# Patient Record
Sex: FEMALE | Race: WHITE | URBAN - METROPOLITAN AREA
[De-identification: names, ages, dates, MRNs, and addresses within clinical notes are randomized per-mention and may not be internally consistent; named-entity substitution may affect disease eponyms.]

---

## 2020-06-02 ENCOUNTER — OFFICE VISIT (OUTPATIENT)
Dept: URBAN - METROPOLITAN AREA CLINIC 97 | Facility: CLINIC | Age: 45
End: 2020-06-02
Payer: COMMERCIAL

## 2020-06-02 VITALS
BODY MASS INDEX: 22.96 KG/M2 | SYSTOLIC BLOOD PRESSURE: 124 MMHG | HEIGHT: 69 IN | DIASTOLIC BLOOD PRESSURE: 78 MMHG | WEIGHT: 155 LBS

## 2020-06-02 DIAGNOSIS — K51.80 OTHER ULCERATIVE COLITIS WITHOUT COMPLICATION: ICD-10-CM

## 2020-06-02 PROCEDURE — 96413 CHEMO IV INFUSION 1 HR: CPT | Performed by: INTERNAL MEDICINE

## 2020-06-02 RX ORDER — DICYCLOMINE HYDROCHLORIDE 10 MG/1
TAKE 1 CAPSULE BY ORAL ROUTE 3 TIMES A DAY AS NEEDED FOR 30 DAYS CAPSULE ORAL
Qty: 90 | Refills: 5 | Status: ACTIVE | COMMUNITY
Start: 2020-02-19 | End: 2020-08-17

## 2020-06-02 RX ORDER — INFLIXIMAB 100 MG/10ML
INJECTION, POWDER, LYOPHILIZED, FOR SOLUTION INTRAVENOUS
Qty: 0 | Refills: 0 | Status: ACTIVE | COMMUNITY
Start: 1900-01-01 | End: 1900-01-01

## 2020-06-02 RX ORDER — SACCHAROMYCES BOULARDII 250 MG
CAPSULE ORAL
Qty: 0 | Refills: 0 | Status: ACTIVE | COMMUNITY
Start: 1900-01-01 | End: 1900-01-01

## 2020-07-28 ENCOUNTER — OFFICE VISIT (OUTPATIENT)
Dept: URBAN - METROPOLITAN AREA CLINIC 97 | Facility: CLINIC | Age: 45
End: 2020-07-28
Payer: COMMERCIAL

## 2020-07-28 DIAGNOSIS — K51.80 OTHER ULCERATIVE COLITIS WITHOUT COMPLICATION: ICD-10-CM

## 2020-07-28 PROCEDURE — 96413 CHEMO IV INFUSION 1 HR: CPT | Performed by: INTERNAL MEDICINE

## 2020-07-28 RX ORDER — SACCHAROMYCES BOULARDII 250 MG
CAPSULE ORAL
Qty: 0 | Refills: 0 | Status: ACTIVE | COMMUNITY
Start: 1900-01-01 | End: 1900-01-01

## 2020-07-28 RX ORDER — INFLIXIMAB 100 MG/10ML
INJECTION, POWDER, LYOPHILIZED, FOR SOLUTION INTRAVENOUS
Qty: 0 | Refills: 0 | Status: ACTIVE | COMMUNITY
Start: 1900-01-01 | End: 1900-01-01

## 2020-07-28 RX ORDER — DICYCLOMINE HYDROCHLORIDE 10 MG/1
TAKE 1 CAPSULE BY ORAL ROUTE 3 TIMES A DAY AS NEEDED FOR 30 DAYS CAPSULE ORAL
Qty: 90 | Refills: 5 | Status: ACTIVE | COMMUNITY
Start: 2020-02-19 | End: 2020-08-17

## 2020-09-22 ENCOUNTER — OFFICE VISIT (OUTPATIENT)
Dept: URBAN - METROPOLITAN AREA CLINIC 97 | Facility: CLINIC | Age: 45
End: 2020-09-22

## 2020-09-24 ENCOUNTER — OFFICE VISIT (OUTPATIENT)
Dept: URBAN - METROPOLITAN AREA CLINIC 97 | Facility: CLINIC | Age: 45
End: 2020-09-24
Payer: COMMERCIAL

## 2020-09-24 DIAGNOSIS — K51.90 COLITIS, ULCERATIVE: ICD-10-CM

## 2020-09-24 PROCEDURE — 96413 CHEMO IV INFUSION 1 HR: CPT | Performed by: INTERNAL MEDICINE

## 2020-09-24 RX ORDER — INFLIXIMAB 100 MG/10ML
INJECTION, POWDER, LYOPHILIZED, FOR SOLUTION INTRAVENOUS
Qty: 0 | Refills: 0 | Status: ACTIVE | COMMUNITY
Start: 1900-01-01 | End: 1900-01-01

## 2020-09-24 RX ORDER — SACCHAROMYCES BOULARDII 250 MG
CAPSULE ORAL
Qty: 0 | Refills: 0 | Status: ACTIVE | COMMUNITY
Start: 1900-01-01 | End: 1900-01-01

## 2020-11-19 ENCOUNTER — OFFICE VISIT (OUTPATIENT)
Dept: URBAN - METROPOLITAN AREA CLINIC 97 | Facility: CLINIC | Age: 45
End: 2020-11-19
Payer: COMMERCIAL

## 2020-11-19 DIAGNOSIS — K51.80 CHRONIC PANCOLONIC ULCERATIVE COLITIS: ICD-10-CM

## 2020-11-19 PROCEDURE — 96413 CHEMO IV INFUSION 1 HR: CPT | Performed by: INTERNAL MEDICINE

## 2020-11-19 RX ORDER — INFLIXIMAB 100 MG/10ML
INJECTION, POWDER, LYOPHILIZED, FOR SOLUTION INTRAVENOUS
Qty: 0 | Refills: 0 | Status: ACTIVE | COMMUNITY
Start: 1900-01-01 | End: 1900-01-01

## 2020-11-19 RX ORDER — SACCHAROMYCES BOULARDII 250 MG
CAPSULE ORAL
Qty: 0 | Refills: 0 | Status: ACTIVE | COMMUNITY
Start: 1900-01-01 | End: 1900-01-01

## 2021-01-14 ENCOUNTER — OFFICE VISIT (OUTPATIENT)
Dept: URBAN - METROPOLITAN AREA CLINIC 97 | Facility: CLINIC | Age: 46
End: 2021-01-14
Payer: COMMERCIAL

## 2021-01-14 VITALS
DIASTOLIC BLOOD PRESSURE: 82 MMHG | RESPIRATION RATE: 16 BRPM | TEMPERATURE: 96.5 F | WEIGHT: 161 LBS | SYSTOLIC BLOOD PRESSURE: 122 MMHG | HEIGHT: 69 IN | BODY MASS INDEX: 23.85 KG/M2

## 2021-01-14 DIAGNOSIS — K51.80 CHRONIC PANCOLONIC ULCERATIVE COLITIS: ICD-10-CM

## 2021-01-14 PROCEDURE — 96413 CHEMO IV INFUSION 1 HR: CPT | Performed by: INTERNAL MEDICINE

## 2021-01-14 PROCEDURE — 96415 CHEMO IV INFUSION ADDL HR: CPT | Performed by: INTERNAL MEDICINE

## 2021-01-14 RX ORDER — INFLIXIMAB 100 MG/10ML
INJECTION, POWDER, LYOPHILIZED, FOR SOLUTION INTRAVENOUS
Qty: 0 | Refills: 0 | Status: ACTIVE | COMMUNITY
Start: 1900-01-01 | End: 1900-01-01

## 2021-01-14 RX ORDER — SACCHAROMYCES BOULARDII 250 MG
CAPSULE ORAL
Qty: 0 | Refills: 0 | Status: ACTIVE | COMMUNITY
Start: 1900-01-01 | End: 1900-01-01

## 2021-03-11 ENCOUNTER — OFFICE VISIT (OUTPATIENT)
Dept: URBAN - METROPOLITAN AREA CLINIC 97 | Facility: CLINIC | Age: 46
End: 2021-03-11
Payer: COMMERCIAL

## 2021-03-11 ENCOUNTER — TELEPHONE ENCOUNTER (OUTPATIENT)
Dept: URBAN - METROPOLITAN AREA CLINIC 18 | Facility: CLINIC | Age: 46
End: 2021-03-11

## 2021-03-11 DIAGNOSIS — K51.80 CHRONIC PANCOLONIC ULCERATIVE COLITIS: ICD-10-CM

## 2021-03-11 PROCEDURE — 96413 CHEMO IV INFUSION 1 HR: CPT | Performed by: INTERNAL MEDICINE

## 2021-03-11 RX ORDER — INFLIXIMAB 100 MG/10ML
10 MG PER KG INJECTION, POWDER, LYOPHILIZED, FOR SOLUTION INTRAVENOUS
Qty: 800 MILLIGRAM | Refills: 6 | OUTPATIENT
Start: 2021-03-11 | End: 2028-02-03

## 2021-03-11 RX ORDER — INFLIXIMAB 100 MG/10ML
INJECTION, POWDER, LYOPHILIZED, FOR SOLUTION INTRAVENOUS
Qty: 0 | Refills: 0 | Status: ACTIVE | COMMUNITY
Start: 1900-01-01 | End: 1900-01-01

## 2021-03-11 RX ORDER — SACCHAROMYCES BOULARDII 250 MG
CAPSULE ORAL
Qty: 0 | Refills: 0 | Status: ACTIVE | COMMUNITY
Start: 1900-01-01 | End: 1900-01-01

## 2021-05-06 ENCOUNTER — OFFICE VISIT (OUTPATIENT)
Dept: URBAN - METROPOLITAN AREA CLINIC 97 | Facility: CLINIC | Age: 46
End: 2021-05-06
Payer: COMMERCIAL

## 2021-05-06 VITALS
WEIGHT: 156 LBS | RESPIRATION RATE: 16 BRPM | BODY MASS INDEX: 23.11 KG/M2 | HEART RATE: 78 BPM | TEMPERATURE: 97.4 F | DIASTOLIC BLOOD PRESSURE: 80 MMHG | SYSTOLIC BLOOD PRESSURE: 128 MMHG | HEIGHT: 69 IN

## 2021-05-06 DIAGNOSIS — K51.80 CHRONIC PANCOLONIC ULCERATIVE COLITIS: ICD-10-CM

## 2021-05-06 PROCEDURE — 96413 CHEMO IV INFUSION 1 HR: CPT | Performed by: INTERNAL MEDICINE

## 2021-05-06 RX ORDER — INFLIXIMAB 100 MG/10ML
INJECTION, POWDER, LYOPHILIZED, FOR SOLUTION INTRAVENOUS
Qty: 0 | Refills: 0 | Status: ACTIVE | COMMUNITY
Start: 1900-01-01 | End: 1900-01-01

## 2021-05-06 RX ORDER — INFLIXIMAB 100 MG/10ML
10 MG PER KG INJECTION, POWDER, LYOPHILIZED, FOR SOLUTION INTRAVENOUS
Qty: 800 MILLIGRAM | Refills: 6 | Status: ACTIVE | COMMUNITY
Start: 2021-03-11 | End: 2028-02-03

## 2021-05-06 RX ORDER — SACCHAROMYCES BOULARDII 250 MG
CAPSULE ORAL
Qty: 0 | Refills: 0 | Status: ACTIVE | COMMUNITY
Start: 1900-01-01 | End: 1900-01-01

## 2021-06-04 ENCOUNTER — WEB ENCOUNTER (OUTPATIENT)
Dept: URBAN - METROPOLITAN AREA CLINIC 92 | Facility: CLINIC | Age: 46
End: 2021-06-04

## 2021-06-16 ENCOUNTER — OFFICE VISIT (OUTPATIENT)
Dept: URBAN - METROPOLITAN AREA TELEHEALTH 2 | Facility: TELEHEALTH | Age: 46
End: 2021-06-16
Payer: COMMERCIAL

## 2021-06-16 DIAGNOSIS — K92.1 BLOOD IN STOOL: ICD-10-CM

## 2021-06-16 DIAGNOSIS — K51.00 ULCERATIVE PANCOLITIS WITHOUT COMPLICATION: ICD-10-CM

## 2021-06-16 PROCEDURE — 99214 OFFICE O/P EST MOD 30 MIN: CPT | Performed by: INTERNAL MEDICINE

## 2021-06-16 RX ORDER — SACCHAROMYCES BOULARDII 250 MG
CAPSULE ORAL
Qty: 0 | Refills: 0 | COMMUNITY
Start: 1900-01-01

## 2021-06-16 RX ORDER — MESALAMINE 1000 MG/1
1 SUPPOSITORY AT BEDTIME SUPPOSITORY RECTAL ONCE A DAY
Qty: 30 | Refills: 3 | OUTPATIENT
Start: 2021-06-16 | End: 2021-10-14

## 2021-06-16 RX ORDER — PREDNISONE 10 MG/1
1 TABLET TABLET ORAL ONCE A DAY
Qty: 30 TABLET | Refills: 0 | OUTPATIENT
Start: 2021-06-16 | End: 2021-07-16

## 2021-06-16 RX ORDER — INFLIXIMAB 100 MG/10ML
INJECTION, POWDER, LYOPHILIZED, FOR SOLUTION INTRAVENOUS
Qty: 0 | Refills: 0 | COMMUNITY
Start: 1900-01-01

## 2021-06-16 RX ORDER — INFLIXIMAB 100 MG/10ML
10 MG PER KG INJECTION, POWDER, LYOPHILIZED, FOR SOLUTION INTRAVENOUS
Qty: 800 MILLIGRAM | Refills: 6 | COMMUNITY
Start: 2021-03-11 | End: 2028-02-03

## 2021-06-16 NOTE — HPI-TODAY'S VISIT:
This is a 45-year-old  female who presents today for sick visit.  She notes that for the last several weeks she has had a worsening in her colitis symptoms.  She is having some diarrhea and passage of mucus and blood in stool.  She did have some leftover prednisone from the past.  She began taking 20 mg a day with improvement in most of her symptoms.  There is no fever.  Her last laboratory work was over a year ago.  Her last colonoscopy was 2 years ago.  Her last Remicade dose was approximately 6 weeks.

## 2021-07-01 ENCOUNTER — OFFICE VISIT (OUTPATIENT)
Dept: URBAN - METROPOLITAN AREA CLINIC 97 | Facility: CLINIC | Age: 46
End: 2021-07-01
Payer: COMMERCIAL

## 2021-07-01 VITALS
BODY MASS INDEX: 23.11 KG/M2 | HEART RATE: 83 BPM | TEMPERATURE: 96.9 F | RESPIRATION RATE: 18 BRPM | DIASTOLIC BLOOD PRESSURE: 85 MMHG | WEIGHT: 156 LBS | SYSTOLIC BLOOD PRESSURE: 120 MMHG | HEIGHT: 69 IN

## 2021-07-01 DIAGNOSIS — K51.80 CHRONIC PANCOLONIC ULCERATIVE COLITIS: ICD-10-CM

## 2021-07-01 PROCEDURE — 96413 CHEMO IV INFUSION 1 HR: CPT | Performed by: INTERNAL MEDICINE

## 2021-07-01 RX ORDER — INFLIXIMAB 100 MG/10ML
10 MG PER KG INJECTION, POWDER, LYOPHILIZED, FOR SOLUTION INTRAVENOUS
Qty: 800 MILLIGRAM | Refills: 6 | COMMUNITY
Start: 2021-03-11 | End: 2028-02-03

## 2021-07-01 RX ORDER — PREDNISONE 10 MG/1
1 TABLET TABLET ORAL ONCE A DAY
Qty: 30 TABLET | Refills: 0 | Status: ACTIVE | COMMUNITY
Start: 2021-06-16 | End: 2021-07-16

## 2021-07-01 RX ORDER — INFLIXIMAB 100 MG/10ML
INJECTION, POWDER, LYOPHILIZED, FOR SOLUTION INTRAVENOUS
Qty: 0 | Refills: 0 | COMMUNITY
Start: 1900-01-01

## 2021-07-01 RX ORDER — SACCHAROMYCES BOULARDII 250 MG
CAPSULE ORAL
Qty: 0 | Refills: 0 | COMMUNITY
Start: 1900-01-01

## 2021-07-01 RX ORDER — MESALAMINE 1000 MG/1
1 SUPPOSITORY AT BEDTIME SUPPOSITORY RECTAL ONCE A DAY
Qty: 30 | Refills: 3 | Status: ACTIVE | COMMUNITY
Start: 2021-06-16 | End: 2021-10-14

## 2021-08-19 ENCOUNTER — OFFICE VISIT (OUTPATIENT)
Dept: URBAN - METROPOLITAN AREA SURGERY CENTER 16 | Facility: SURGERY CENTER | Age: 46
End: 2021-08-19

## 2021-08-26 ENCOUNTER — OFFICE VISIT (OUTPATIENT)
Dept: URBAN - METROPOLITAN AREA CLINIC 97 | Facility: CLINIC | Age: 46
End: 2021-08-26
Payer: COMMERCIAL

## 2021-08-26 ENCOUNTER — TELEPHONE ENCOUNTER (OUTPATIENT)
Dept: URBAN - METROPOLITAN AREA CLINIC 18 | Facility: CLINIC | Age: 46
End: 2021-08-26

## 2021-08-26 DIAGNOSIS — K51.80 CHRONIC PANCOLONIC ULCERATIVE COLITIS: ICD-10-CM

## 2021-08-26 PROCEDURE — 96413 CHEMO IV INFUSION 1 HR: CPT | Performed by: INTERNAL MEDICINE

## 2021-08-26 RX ORDER — MESALAMINE 1000 MG/1
1 SUPPOSITORY AT BEDTIME SUPPOSITORY RECTAL ONCE A DAY
Qty: 30 | Refills: 3 | Status: ACTIVE | COMMUNITY
Start: 2021-06-16 | End: 2021-10-14

## 2021-08-26 RX ORDER — INFLIXIMAB 100 MG/10ML
INJECTION, POWDER, LYOPHILIZED, FOR SOLUTION INTRAVENOUS
Qty: 0 | Refills: 0 | COMMUNITY
Start: 1900-01-01

## 2021-08-26 RX ORDER — INFLIXIMAB 100 MG/10ML
10 MG PER KG INJECTION, POWDER, LYOPHILIZED, FOR SOLUTION INTRAVENOUS
Qty: 800 MILLIGRAM | Refills: 6 | COMMUNITY
Start: 2021-03-11 | End: 2028-02-03

## 2021-08-26 RX ORDER — SACCHAROMYCES BOULARDII 250 MG
CAPSULE ORAL
Qty: 0 | Refills: 0 | COMMUNITY
Start: 1900-01-01

## 2021-10-20 ENCOUNTER — OFFICE VISIT (OUTPATIENT)
Dept: URBAN - METROPOLITAN AREA SURGERY CENTER 16 | Facility: SURGERY CENTER | Age: 46
End: 2021-10-20
Payer: COMMERCIAL

## 2021-10-20 ENCOUNTER — CLAIMS CREATED FROM THE CLAIM WINDOW (OUTPATIENT)
Dept: URBAN - METROPOLITAN AREA CLINIC 4 | Facility: CLINIC | Age: 46
End: 2021-10-20
Payer: COMMERCIAL

## 2021-10-20 DIAGNOSIS — D12.8 ADENOMATOUS POLYP OF RECTUM: ICD-10-CM

## 2021-10-20 DIAGNOSIS — K51.00 ACUTE ULCERATIVE PANCOLITIS: ICD-10-CM

## 2021-10-20 DIAGNOSIS — D12.8 BENIGN NEOPLASM OF RECTUM: ICD-10-CM

## 2021-10-20 DIAGNOSIS — K63.89 BACTERIAL OVERGROWTH SYNDROME: ICD-10-CM

## 2021-10-20 DIAGNOSIS — K63.89 POLYP, HYPERPLASTIC: ICD-10-CM

## 2021-10-20 DIAGNOSIS — K62.89 ACUTE PROCTITIS: ICD-10-CM

## 2021-10-20 PROCEDURE — 88305 TISSUE EXAM BY PATHOLOGIST: CPT | Performed by: PATHOLOGY

## 2021-10-20 PROCEDURE — 45380 COLONOSCOPY AND BIOPSY: CPT | Performed by: INTERNAL MEDICINE

## 2021-10-20 PROCEDURE — G8907 PT DOC NO EVENTS ON DISCHARG: HCPCS | Performed by: INTERNAL MEDICINE

## 2021-10-20 RX ORDER — INFLIXIMAB 100 MG/10ML
INJECTION, POWDER, LYOPHILIZED, FOR SOLUTION INTRAVENOUS
Qty: 0 | Refills: 0 | COMMUNITY
Start: 1900-01-01

## 2021-10-20 RX ORDER — SACCHAROMYCES BOULARDII 250 MG
CAPSULE ORAL
Qty: 0 | Refills: 0 | COMMUNITY
Start: 1900-01-01

## 2021-10-20 RX ORDER — INFLIXIMAB 100 MG/10ML
10 MG PER KG INJECTION, POWDER, LYOPHILIZED, FOR SOLUTION INTRAVENOUS
Qty: 800 MILLIGRAM | Refills: 6 | COMMUNITY
Start: 2021-03-11 | End: 2028-02-03

## 2021-10-21 ENCOUNTER — OFFICE VISIT (OUTPATIENT)
Dept: URBAN - METROPOLITAN AREA CLINIC 97 | Facility: CLINIC | Age: 46
End: 2021-10-21
Payer: COMMERCIAL

## 2021-10-21 DIAGNOSIS — K51.80 CHRONIC PANCOLONIC ULCERATIVE COLITIS: ICD-10-CM

## 2021-10-21 PROCEDURE — 96413 CHEMO IV INFUSION 1 HR: CPT | Performed by: INTERNAL MEDICINE

## 2021-10-21 RX ORDER — INFLIXIMAB 100 MG/10ML
INJECTION, POWDER, LYOPHILIZED, FOR SOLUTION INTRAVENOUS
Qty: 0 | Refills: 0 | COMMUNITY
Start: 1900-01-01

## 2021-10-21 RX ORDER — SACCHAROMYCES BOULARDII 250 MG
CAPSULE ORAL
Qty: 0 | Refills: 0 | COMMUNITY
Start: 1900-01-01

## 2021-10-21 RX ORDER — INFLIXIMAB 100 MG/10ML
10 MG PER KG INJECTION, POWDER, LYOPHILIZED, FOR SOLUTION INTRAVENOUS
Qty: 800 MILLIGRAM | Refills: 6 | COMMUNITY
Start: 2021-03-11 | End: 2028-02-03

## 2021-11-03 ENCOUNTER — OFFICE VISIT (OUTPATIENT)
Dept: URBAN - METROPOLITAN AREA CLINIC 92 | Facility: CLINIC | Age: 46
End: 2021-11-03

## 2021-12-07 ENCOUNTER — TELEPHONE ENCOUNTER (OUTPATIENT)
Dept: URBAN - METROPOLITAN AREA CLINIC 92 | Facility: CLINIC | Age: 46
End: 2021-12-07

## 2021-12-16 ENCOUNTER — OFFICE VISIT (OUTPATIENT)
Dept: URBAN - METROPOLITAN AREA CLINIC 97 | Facility: CLINIC | Age: 46
End: 2021-12-16
Payer: COMMERCIAL

## 2021-12-16 ENCOUNTER — TELEPHONE ENCOUNTER (OUTPATIENT)
Dept: URBAN - METROPOLITAN AREA CLINIC 18 | Facility: CLINIC | Age: 46
End: 2021-12-16

## 2021-12-16 ENCOUNTER — LAB OUTSIDE AN ENCOUNTER (OUTPATIENT)
Dept: URBAN - METROPOLITAN AREA CLINIC 18 | Facility: CLINIC | Age: 46
End: 2021-12-16

## 2021-12-16 DIAGNOSIS — K51.80 CHRONIC PANCOLONIC ULCERATIVE COLITIS: ICD-10-CM

## 2021-12-16 PROCEDURE — 96413 CHEMO IV INFUSION 1 HR: CPT | Performed by: INTERNAL MEDICINE

## 2021-12-16 RX ORDER — INFLIXIMAB 100 MG/10ML
INJECTION, POWDER, LYOPHILIZED, FOR SOLUTION INTRAVENOUS
Qty: 0 | Refills: 0 | COMMUNITY
Start: 1900-01-01

## 2021-12-16 RX ORDER — INFLIXIMAB 100 MG/10ML
10 MG PER KG INJECTION, POWDER, LYOPHILIZED, FOR SOLUTION INTRAVENOUS
Qty: 800 MILLIGRAM | Refills: 6 | COMMUNITY
Start: 2021-03-11 | End: 2028-02-03

## 2021-12-16 RX ORDER — SACCHAROMYCES BOULARDII 250 MG
CAPSULE ORAL
Qty: 0 | Refills: 0 | COMMUNITY
Start: 1900-01-01

## 2022-02-10 ENCOUNTER — OFFICE VISIT (OUTPATIENT)
Dept: URBAN - METROPOLITAN AREA CLINIC 97 | Facility: CLINIC | Age: 47
End: 2022-02-10
Payer: COMMERCIAL

## 2022-02-10 VITALS
HEART RATE: 78 BPM | WEIGHT: 159 LBS | RESPIRATION RATE: 16 BRPM | BODY MASS INDEX: 23.55 KG/M2 | HEIGHT: 69 IN | SYSTOLIC BLOOD PRESSURE: 135 MMHG | TEMPERATURE: 97 F | DIASTOLIC BLOOD PRESSURE: 80 MMHG

## 2022-02-10 DIAGNOSIS — K51.80 CHRONIC PANCOLONIC ULCERATIVE COLITIS: ICD-10-CM

## 2022-02-10 PROCEDURE — 96413 CHEMO IV INFUSION 1 HR: CPT | Performed by: INTERNAL MEDICINE

## 2022-02-10 RX ORDER — INFLIXIMAB 100 MG/10ML
INJECTION, POWDER, LYOPHILIZED, FOR SOLUTION INTRAVENOUS
Qty: 0 | Refills: 0 | COMMUNITY
Start: 1900-01-01

## 2022-02-10 RX ORDER — INFLIXIMAB 100 MG/10ML
10 MG PER KG INJECTION, POWDER, LYOPHILIZED, FOR SOLUTION INTRAVENOUS
Qty: 800 MILLIGRAM | Refills: 6 | COMMUNITY
Start: 2021-03-11 | End: 2028-02-03

## 2022-02-10 RX ORDER — SACCHAROMYCES BOULARDII 250 MG
CAPSULE ORAL
Qty: 0 | Refills: 0 | COMMUNITY
Start: 1900-01-01

## 2022-04-08 LAB
A/G RATIO: 1.4
ALBUMIN: 4.3
ALKALINE PHOSPHATASE: 62
ALT (SGPT): 10
AST (SGOT): 19
BASO (ABSOLUTE): 0
BASOS: 1
BILIRUBIN, TOTAL: 0.4
BUN/CREATININE RATIO: 22
BUN: 17
CALCIUM: 9.2
CARBON DIOXIDE, TOTAL: 23
CHLORIDE: 105
CREATININE: 0.79
EGFR: 95
EOS (ABSOLUTE): 0.1
EOS: 2
GLOBULIN, TOTAL: 3
GLUCOSE: 86
HEMATOCRIT: 37.6
HEMATOLOGY COMMENTS:: (no result)
HEMOGLOBIN: 12.1
IMMATURE CELLS: (no result)
IMMATURE GRANS (ABS): 0
IMMATURE GRANULOCYTES: 0
LYMPHS (ABSOLUTE): 1.9
LYMPHS: 34
MCH: 28.1
MCHC: 32.2
MCV: 87
MONOCYTES(ABSOLUTE): 0.5
MONOCYTES: 8
NEUTROPHILS (ABSOLUTE): 3.2
NEUTROPHILS: 55
NRBC: (no result)
PLATELETS: 294
POTASSIUM: 4.3
PROTEIN, TOTAL: 7.3
QUANTIFERON CRITERIA: (no result)
QUANTIFERON INCUBATION: (no result)
QUANTIFERON MITOGEN VALUE: >10
QUANTIFERON NIL VALUE: 0.04
QUANTIFERON TB1 AG VALUE: 0.08
QUANTIFERON TB2 AG VALUE: 0.05
QUANTIFERON-TB GOLD PLUS: NEGATIVE
RBC: 4.31
RDW: 13.1
SODIUM: 140
WBC: 5.8

## 2022-04-15 ENCOUNTER — TELEPHONE ENCOUNTER (OUTPATIENT)
Dept: URBAN - METROPOLITAN AREA CLINIC 92 | Facility: CLINIC | Age: 47
End: 2022-04-15

## 2022-04-15 RX ORDER — INFLIXIMAB 100 MG/10ML
AS DIRECTED INJECTION, POWDER, LYOPHILIZED, FOR SOLUTION INTRAVENOUS
Qty: 100 MILLIGRAMS | Refills: 0 | OUTPATIENT
Start: 2022-04-15 | End: 2022-05-15

## 2022-04-18 ENCOUNTER — TELEPHONE ENCOUNTER (OUTPATIENT)
Dept: URBAN - METROPOLITAN AREA CLINIC 98 | Facility: CLINIC | Age: 47
End: 2022-04-18

## 2022-05-04 ENCOUNTER — TELEPHONE ENCOUNTER (OUTPATIENT)
Dept: URBAN - METROPOLITAN AREA CLINIC 98 | Facility: CLINIC | Age: 47
End: 2022-05-04

## 2022-05-06 ENCOUNTER — OFFICE VISIT (OUTPATIENT)
Dept: URBAN - METROPOLITAN AREA CLINIC 92 | Facility: CLINIC | Age: 47
End: 2022-05-06
Payer: COMMERCIAL

## 2022-05-06 ENCOUNTER — WEB ENCOUNTER (OUTPATIENT)
Dept: URBAN - METROPOLITAN AREA CLINIC 92 | Facility: CLINIC | Age: 47
End: 2022-05-06

## 2022-05-06 VITALS
DIASTOLIC BLOOD PRESSURE: 103 MMHG | WEIGHT: 157 LBS | SYSTOLIC BLOOD PRESSURE: 154 MMHG | HEART RATE: 73 BPM | HEIGHT: 69 IN | TEMPERATURE: 96.6 F | BODY MASS INDEX: 23.25 KG/M2

## 2022-05-06 DIAGNOSIS — K51.80 CHRONIC PANCOLONIC ULCERATIVE COLITIS: ICD-10-CM

## 2022-05-06 PROCEDURE — 99212 OFFICE O/P EST SF 10 MIN: CPT | Performed by: INTERNAL MEDICINE

## 2022-05-06 RX ORDER — SACCHAROMYCES BOULARDII 250 MG
CAPSULE ORAL
Qty: 0 | Refills: 0 | COMMUNITY
Start: 1900-01-01

## 2022-05-06 RX ORDER — INFLIXIMAB 100 MG/10ML
AS DIRECTED INJECTION, POWDER, LYOPHILIZED, FOR SOLUTION INTRAVENOUS
Qty: 100 MILLIGRAMS | Refills: 0 | Status: ACTIVE | COMMUNITY
Start: 2022-04-15 | End: 2022-05-15

## 2022-05-06 RX ORDER — INFLIXIMAB 100 MG/10ML
10 MG PER KG INJECTION, POWDER, LYOPHILIZED, FOR SOLUTION INTRAVENOUS
Qty: 800 MILLIGRAM | Refills: 6 | COMMUNITY
Start: 2021-03-11 | End: 2028-02-03

## 2022-05-06 RX ORDER — INFLIXIMAB 100 MG/10ML
INJECTION, POWDER, LYOPHILIZED, FOR SOLUTION INTRAVENOUS
Qty: 0 | Refills: 0 | COMMUNITY
Start: 1900-01-01

## 2022-05-06 NOTE — HPI-TODAY'S VISIT:
This is a 46-year-old female who presents today for follow-up.  She was previously cared for by Dr. Rick prior to her group home.  Patient notes that her last infusion was on February 10.  She has since had an insurance denial and is 1 month behind schedule.  Overall she is still doing well although has begun to notice some intermittent loose stools with intermittent mucus.  There is no blood in stool.  She has had some fatigue.  She is extremely anxious about resuming her medication and the possibility of having to change agents.

## 2022-05-12 ENCOUNTER — OFFICE VISIT (OUTPATIENT)
Dept: URBAN - METROPOLITAN AREA CLINIC 97 | Facility: CLINIC | Age: 47
End: 2022-05-12
Payer: COMMERCIAL

## 2022-05-12 VITALS
SYSTOLIC BLOOD PRESSURE: 134 MMHG | DIASTOLIC BLOOD PRESSURE: 79 MMHG | WEIGHT: 158 LBS | TEMPERATURE: 97.2 F | HEIGHT: 69 IN | RESPIRATION RATE: 16 BRPM | BODY MASS INDEX: 23.4 KG/M2 | HEART RATE: 72 BPM

## 2022-05-12 DIAGNOSIS — K51.80 CHRONIC PANCOLONIC ULCERATIVE COLITIS: ICD-10-CM

## 2022-05-12 PROCEDURE — 96413 CHEMO IV INFUSION 1 HR: CPT | Performed by: INTERNAL MEDICINE

## 2022-05-12 RX ORDER — INFLIXIMAB 100 MG/10ML
INJECTION, POWDER, LYOPHILIZED, FOR SOLUTION INTRAVENOUS
Qty: 0 | Refills: 0 | COMMUNITY
Start: 1900-01-01

## 2022-05-12 RX ORDER — SACCHAROMYCES BOULARDII 250 MG
CAPSULE ORAL
Qty: 0 | Refills: 0 | COMMUNITY
Start: 1900-01-01

## 2022-05-12 RX ORDER — INFLIXIMAB 100 MG/10ML
10 MG PER KG INJECTION, POWDER, LYOPHILIZED, FOR SOLUTION INTRAVENOUS
Qty: 800 MILLIGRAM | Refills: 6 | COMMUNITY
Start: 2021-03-11 | End: 2028-02-03

## 2022-05-12 RX ORDER — INFLIXIMAB 100 MG/10ML
AS DIRECTED INJECTION, POWDER, LYOPHILIZED, FOR SOLUTION INTRAVENOUS
Qty: 100 MILLIGRAMS | Refills: 0 | Status: ACTIVE | COMMUNITY
Start: 2022-04-15 | End: 2022-05-15

## 2022-06-24 PROBLEM — 64766004 ULCERATIVE COLITIS: Status: ACTIVE | Noted: 2021-03-03

## 2022-07-05 ENCOUNTER — OFFICE VISIT (OUTPATIENT)
Dept: URBAN - METROPOLITAN AREA CLINIC 97 | Facility: CLINIC | Age: 47
End: 2022-07-05
Payer: COMMERCIAL

## 2022-07-05 VITALS
TEMPERATURE: 97 F | RESPIRATION RATE: 17 BRPM | WEIGHT: 158 LBS | SYSTOLIC BLOOD PRESSURE: 124 MMHG | HEIGHT: 69 IN | BODY MASS INDEX: 23.4 KG/M2 | HEART RATE: 75 BPM | DIASTOLIC BLOOD PRESSURE: 78 MMHG

## 2022-07-05 DIAGNOSIS — K51.80 CHRONIC PANCOLONIC ULCERATIVE COLITIS: ICD-10-CM

## 2022-07-05 PROCEDURE — 96413 CHEMO IV INFUSION 1 HR: CPT | Performed by: INTERNAL MEDICINE

## 2022-07-05 RX ORDER — INFLIXIMAB 100 MG/10ML
10 MG PER KG INJECTION, POWDER, LYOPHILIZED, FOR SOLUTION INTRAVENOUS
Qty: 800 MILLIGRAM | Refills: 6 | COMMUNITY
Start: 2021-03-11 | End: 2028-02-03

## 2022-07-05 RX ORDER — SACCHAROMYCES BOULARDII 250 MG
CAPSULE ORAL
Qty: 0 | Refills: 0 | COMMUNITY
Start: 1900-01-01

## 2022-07-05 RX ORDER — INFLIXIMAB 100 MG/10ML
INJECTION, POWDER, LYOPHILIZED, FOR SOLUTION INTRAVENOUS
Qty: 0 | Refills: 0 | COMMUNITY
Start: 1900-01-01

## 2022-08-30 ENCOUNTER — OFFICE VISIT (OUTPATIENT)
Dept: URBAN - METROPOLITAN AREA CLINIC 97 | Facility: CLINIC | Age: 47
End: 2022-08-30
Payer: COMMERCIAL

## 2022-08-30 VITALS
TEMPERATURE: 96.7 F | SYSTOLIC BLOOD PRESSURE: 142 MMHG | RESPIRATION RATE: 18 BRPM | HEIGHT: 69 IN | WEIGHT: 158 LBS | DIASTOLIC BLOOD PRESSURE: 84 MMHG | HEART RATE: 72 BPM | BODY MASS INDEX: 23.4 KG/M2

## 2022-08-30 DIAGNOSIS — K51.90 ACUTE ULCERATIVE COLITIS: ICD-10-CM

## 2022-08-30 PROCEDURE — 96413 CHEMO IV INFUSION 1 HR: CPT | Performed by: INTERNAL MEDICINE

## 2022-08-30 RX ORDER — SACCHAROMYCES BOULARDII 250 MG
CAPSULE ORAL
Qty: 0 | Refills: 0 | COMMUNITY
Start: 1900-01-01

## 2022-08-30 RX ORDER — INFLIXIMAB 100 MG/10ML
INJECTION, POWDER, LYOPHILIZED, FOR SOLUTION INTRAVENOUS
Qty: 0 | Refills: 0 | COMMUNITY
Start: 1900-01-01

## 2022-08-30 RX ORDER — INFLIXIMAB 100 MG/10ML
10 MG PER KG INJECTION, POWDER, LYOPHILIZED, FOR SOLUTION INTRAVENOUS
Qty: 800 MILLIGRAM | Refills: 6 | COMMUNITY
Start: 2021-03-11 | End: 2028-02-03

## 2022-09-21 ENCOUNTER — TELEPHONE ENCOUNTER (OUTPATIENT)
Dept: URBAN - METROPOLITAN AREA CLINIC 97 | Facility: CLINIC | Age: 47
End: 2022-09-21

## 2022-09-28 ENCOUNTER — TELEPHONE ENCOUNTER (OUTPATIENT)
Dept: URBAN - METROPOLITAN AREA CLINIC 97 | Facility: CLINIC | Age: 47
End: 2022-09-28

## 2022-10-18 ENCOUNTER — TELEPHONE ENCOUNTER (OUTPATIENT)
Dept: URBAN - METROPOLITAN AREA CLINIC 97 | Facility: CLINIC | Age: 47
End: 2022-10-18

## 2022-10-25 ENCOUNTER — TELEPHONE ENCOUNTER (OUTPATIENT)
Dept: URBAN - METROPOLITAN AREA CLINIC 97 | Facility: CLINIC | Age: 47
End: 2022-10-25

## 2022-10-26 ENCOUNTER — TELEPHONE ENCOUNTER (OUTPATIENT)
Dept: URBAN - METROPOLITAN AREA CLINIC 98 | Facility: CLINIC | Age: 47
End: 2022-10-26

## 2022-10-26 PROBLEM — 444548001: Status: ACTIVE | Noted: 2021-06-16

## 2022-11-21 ENCOUNTER — TELEPHONE ENCOUNTER (OUTPATIENT)
Dept: URBAN - METROPOLITAN AREA CLINIC 92 | Facility: CLINIC | Age: 47
End: 2022-11-21

## 2022-11-21 PROBLEM — 442159003: Status: ACTIVE | Noted: 2021-03-11

## 2022-11-21 RX ORDER — SACCHAROMYCES BOULARDII 250 MG
CAPSULE ORAL
Qty: 0 | Refills: 0 | COMMUNITY
Start: 1900-01-01

## 2022-11-21 RX ORDER — INFLIXIMAB 100 MG/10ML
INJECTION, POWDER, LYOPHILIZED, FOR SOLUTION INTRAVENOUS
Qty: 0 | Refills: 0 | COMMUNITY
Start: 1900-01-01

## 2022-11-21 RX ORDER — INFLIXIMAB-DYYB 100 MG/10ML
10MG/KG INJECTION, POWDER, LYOPHILIZED, FOR SOLUTION INTRAVENOUS
Qty: 0.8 GRAM | Refills: 11 | OUTPATIENT
Start: 2022-11-21 | End: 2024-11-10

## 2022-11-21 RX ORDER — INFLIXIMAB 100 MG/10ML
10 MG PER KG INJECTION, POWDER, LYOPHILIZED, FOR SOLUTION INTRAVENOUS
Qty: 800 MILLIGRAM | Refills: 6 | COMMUNITY
Start: 2021-03-11 | End: 2028-02-03

## 2023-05-09 LAB
HEP B CORE AB, IGM: NEGATIVE
HEP B SURFACE AB, QUAL: NON REACTIVE
HEP BE AG: NEGATIVE

## 2023-06-16 ENCOUNTER — TELEPHONE ENCOUNTER (OUTPATIENT)
Dept: URBAN - METROPOLITAN AREA CLINIC 92 | Facility: CLINIC | Age: 48
End: 2023-06-16

## 2023-06-22 ENCOUNTER — OFFICE VISIT (OUTPATIENT)
Dept: URBAN - METROPOLITAN AREA CLINIC 92 | Facility: CLINIC | Age: 48
End: 2023-06-22
Payer: COMMERCIAL

## 2023-06-22 VITALS
TEMPERATURE: 96.4 F | SYSTOLIC BLOOD PRESSURE: 132 MMHG | BODY MASS INDEX: 23.85 KG/M2 | HEART RATE: 71 BPM | HEIGHT: 69 IN | DIASTOLIC BLOOD PRESSURE: 87 MMHG | WEIGHT: 161 LBS

## 2023-06-22 DIAGNOSIS — K51.00 ULCERATIVE PANCOLITIS WITHOUT COMPLICATION: ICD-10-CM

## 2023-06-22 PROCEDURE — 99214 OFFICE O/P EST MOD 30 MIN: CPT | Performed by: INTERNAL MEDICINE

## 2023-06-22 RX ORDER — INFLIXIMAB-DYYB 100 MG/10ML
10MG/KG INJECTION, POWDER, LYOPHILIZED, FOR SOLUTION INTRAVENOUS
Qty: 0.8 GRAM | Refills: 11 | Status: ACTIVE | COMMUNITY
Start: 2022-11-21 | End: 2024-11-10

## 2023-06-22 RX ORDER — DIPHENHYDRAMINE HCL 2 %
1 TAB CREAM (GRAM) TOPICAL
Qty: 30 | Refills: 11 | OUTPATIENT
Start: 2023-06-22 | End: 2024-06-16

## 2023-06-22 RX ORDER — INFLIXIMAB 100 MG/10ML
INJECTION, POWDER, LYOPHILIZED, FOR SOLUTION INTRAVENOUS
Qty: 0 | Refills: 0 | Status: ON HOLD | COMMUNITY
Start: 1900-01-01

## 2023-06-22 RX ORDER — INFLIXIMAB 100 MG/10ML
10 MG PER KG INJECTION, POWDER, LYOPHILIZED, FOR SOLUTION INTRAVENOUS
Qty: 800 MILLIGRAM | Refills: 6 | Status: ON HOLD | COMMUNITY
Start: 2021-03-11 | End: 2028-02-03

## 2023-06-22 RX ORDER — SACCHAROMYCES BOULARDII 250 MG
CAPSULE ORAL
Qty: 0 | Refills: 0 | Status: ACTIVE | COMMUNITY
Start: 1900-01-01

## 2023-06-22 RX ORDER — INFLIXIMAB 100 MG/10ML
AS DIRECTED INJECTION, POWDER, LYOPHILIZED, FOR SOLUTION INTRAVENOUS
Qty: 100 | Refills: 0 | OUTPATIENT
Start: 2023-06-22 | End: 2023-07-22

## 2023-06-22 RX ORDER — METHYLPREDNISOLONE SODIUM SUCCINATE 40 MG/ML
20 MG INJECTION, POWDER, FOR SOLUTION INTRAMUSCULAR; INTRAVENOUS
Qty: 20 UNSPECIFIED | Refills: 11 | OUTPATIENT
Start: 2023-06-22 | End: 2024-06-16

## 2023-06-22 NOTE — HPI-TODAY'S VISIT:
This is a 47-year-old female presents for follow-up.  Due to insurance reason she was changed from Remicade to Inflectra.  Her insurance is no longer covering Inflectra and recommended to revert back to Remicade.  She notes that with these changes she has significant anxiety this results in some looser stools.  There is no blood in stool.  She is quite frustrated over her insurance company

## 2023-06-23 ENCOUNTER — TELEPHONE ENCOUNTER (OUTPATIENT)
Dept: URBAN - METROPOLITAN AREA CLINIC 91 | Facility: CLINIC | Age: 48
End: 2023-06-23

## 2023-06-27 LAB
MITOGEN-NIL: 7.48
QUANTIFERON NIL VALUE: 0.02
QUANTIFERON TB1 AG VALUE: 0
QUANTIFERON TB2 AG VALUE: 0
QUANTIFERON-TB GOLD PLUS: NEGATIVE

## 2023-07-10 ENCOUNTER — OFFICE VISIT (OUTPATIENT)
Dept: URBAN - METROPOLITAN AREA CLINIC 97 | Facility: CLINIC | Age: 48
End: 2023-07-10
Payer: COMMERCIAL

## 2023-07-10 VITALS
RESPIRATION RATE: 20 BRPM | HEIGHT: 69 IN | HEART RATE: 72 BPM | TEMPERATURE: 98.1 F | BODY MASS INDEX: 24.14 KG/M2 | SYSTOLIC BLOOD PRESSURE: 122 MMHG | DIASTOLIC BLOOD PRESSURE: 80 MMHG | WEIGHT: 163 LBS

## 2023-07-10 DIAGNOSIS — K51.80 OTHER ULCERATIVE COLITIS WITHOUT COMPLICATIONS: ICD-10-CM

## 2023-07-10 PROCEDURE — 96413 CHEMO IV INFUSION 1 HR: CPT | Performed by: INTERNAL MEDICINE

## 2023-07-10 RX ORDER — INFLIXIMAB 100 MG/10ML
AS DIRECTED INJECTION, POWDER, LYOPHILIZED, FOR SOLUTION INTRAVENOUS
Qty: 100 | Refills: 0 | Status: ACTIVE | COMMUNITY
Start: 2023-06-22 | End: 2023-07-22

## 2023-07-10 RX ORDER — INFLIXIMAB 100 MG/10ML
INJECTION, POWDER, LYOPHILIZED, FOR SOLUTION INTRAVENOUS
Qty: 0 | Refills: 0 | Status: ON HOLD | COMMUNITY
Start: 1900-01-01

## 2023-07-10 RX ORDER — INFLIXIMAB 100 MG/10ML
10 MG PER KG INJECTION, POWDER, LYOPHILIZED, FOR SOLUTION INTRAVENOUS
Qty: 800 MILLIGRAM | Refills: 6 | Status: ON HOLD | COMMUNITY
Start: 2021-03-11 | End: 2028-02-03

## 2023-07-10 RX ORDER — INFLIXIMAB-DYYB 100 MG/10ML
10MG/KG INJECTION, POWDER, LYOPHILIZED, FOR SOLUTION INTRAVENOUS
Qty: 0.8 GRAM | Refills: 11 | Status: ACTIVE | COMMUNITY
Start: 2022-11-21 | End: 2024-11-10

## 2023-07-10 RX ORDER — SACCHAROMYCES BOULARDII 250 MG
CAPSULE ORAL
Qty: 0 | Refills: 0 | Status: ACTIVE | COMMUNITY
Start: 1900-01-01

## 2023-07-10 RX ORDER — DIPHENHYDRAMINE HCL 2 %
1 TAB CREAM (GRAM) TOPICAL
Qty: 30 | Refills: 11 | Status: ACTIVE | COMMUNITY
Start: 2023-06-22 | End: 2024-06-16

## 2023-07-10 RX ORDER — METHYLPREDNISOLONE SODIUM SUCCINATE 40 MG/ML
20 MG INJECTION, POWDER, FOR SOLUTION INTRAMUSCULAR; INTRAVENOUS
Qty: 20 UNSPECIFIED | Refills: 11 | Status: ACTIVE | COMMUNITY
Start: 2023-06-22 | End: 2024-06-16

## 2023-09-05 ENCOUNTER — OFFICE VISIT (OUTPATIENT)
Dept: URBAN - METROPOLITAN AREA CLINIC 97 | Facility: CLINIC | Age: 48
End: 2023-09-05
Payer: COMMERCIAL

## 2023-09-05 VITALS
TEMPERATURE: 98.2 F | WEIGHT: 164 LBS | DIASTOLIC BLOOD PRESSURE: 66 MMHG | BODY MASS INDEX: 24.29 KG/M2 | RESPIRATION RATE: 18 BRPM | HEIGHT: 69 IN | HEART RATE: 75 BPM | SYSTOLIC BLOOD PRESSURE: 105 MMHG

## 2023-09-05 DIAGNOSIS — K51.80 CHRONIC PANCOLONIC ULCERATIVE COLITIS: ICD-10-CM

## 2023-09-05 PROCEDURE — 96413 CHEMO IV INFUSION 1 HR: CPT | Performed by: INTERNAL MEDICINE

## 2023-09-05 RX ORDER — DIPHENHYDRAMINE HCL 2 %
1 TAB CREAM (GRAM) TOPICAL
Qty: 30 | Refills: 11 | Status: ACTIVE | COMMUNITY
Start: 2023-06-22 | End: 2024-06-16

## 2023-09-05 RX ORDER — SACCHAROMYCES BOULARDII 250 MG
CAPSULE ORAL
Qty: 0 | Refills: 0 | Status: ACTIVE | COMMUNITY
Start: 1900-01-01

## 2023-09-05 RX ORDER — INFLIXIMAB 100 MG/10ML
INJECTION, POWDER, LYOPHILIZED, FOR SOLUTION INTRAVENOUS
Qty: 0 | Refills: 0 | Status: ON HOLD | COMMUNITY
Start: 1900-01-01

## 2023-09-05 RX ORDER — INFLIXIMAB 100 MG/10ML
10 MG PER KG INJECTION, POWDER, LYOPHILIZED, FOR SOLUTION INTRAVENOUS
Qty: 800 MILLIGRAM | Refills: 6 | Status: ON HOLD | COMMUNITY
Start: 2021-03-11 | End: 2028-02-03

## 2023-09-05 RX ORDER — METHYLPREDNISOLONE SODIUM SUCCINATE 40 MG/ML
20 MG INJECTION, POWDER, FOR SOLUTION INTRAMUSCULAR; INTRAVENOUS
Qty: 20 UNSPECIFIED | Refills: 11 | Status: ACTIVE | COMMUNITY
Start: 2023-06-22 | End: 2024-06-16

## 2023-09-05 RX ORDER — INFLIXIMAB-DYYB 100 MG/10ML
10MG/KG INJECTION, POWDER, LYOPHILIZED, FOR SOLUTION INTRAVENOUS
Qty: 0.8 GRAM | Refills: 11 | Status: ACTIVE | COMMUNITY
Start: 2022-11-21 | End: 2024-11-10

## 2023-10-31 ENCOUNTER — OFFICE VISIT (OUTPATIENT)
Dept: URBAN - METROPOLITAN AREA CLINIC 97 | Facility: CLINIC | Age: 48
End: 2023-10-31
Payer: COMMERCIAL

## 2023-10-31 VITALS
BODY MASS INDEX: 24.14 KG/M2 | DIASTOLIC BLOOD PRESSURE: 81 MMHG | HEART RATE: 72 BPM | RESPIRATION RATE: 18 BRPM | HEIGHT: 69 IN | WEIGHT: 163 LBS | SYSTOLIC BLOOD PRESSURE: 131 MMHG | TEMPERATURE: 97.9 F

## 2023-10-31 DIAGNOSIS — K51.80 OTHER ULCERATIVE COLITIS WITHOUT COMPLICATIONS: ICD-10-CM

## 2023-10-31 PROCEDURE — 96413 CHEMO IV INFUSION 1 HR: CPT | Performed by: INTERNAL MEDICINE

## 2023-10-31 RX ORDER — DIPHENHYDRAMINE HCL 2 %
1 TAB CREAM (GRAM) TOPICAL
Qty: 30 | Refills: 11 | Status: ACTIVE | COMMUNITY
Start: 2023-06-22 | End: 2024-06-16

## 2023-10-31 RX ORDER — INFLIXIMAB 100 MG/10ML
10 MG PER KG INJECTION, POWDER, LYOPHILIZED, FOR SOLUTION INTRAVENOUS
Qty: 800 MILLIGRAM | Refills: 6 | Status: ON HOLD | COMMUNITY
Start: 2021-03-11 | End: 2028-02-03

## 2023-10-31 RX ORDER — INFLIXIMAB 100 MG/10ML
INJECTION, POWDER, LYOPHILIZED, FOR SOLUTION INTRAVENOUS
Qty: 0 | Refills: 0 | Status: ON HOLD | COMMUNITY
Start: 1900-01-01

## 2023-10-31 RX ORDER — METHYLPREDNISOLONE SODIUM SUCCINATE 40 MG/ML
20 MG INJECTION, POWDER, FOR SOLUTION INTRAMUSCULAR; INTRAVENOUS
Qty: 20 UNSPECIFIED | Refills: 11 | Status: ACTIVE | COMMUNITY
Start: 2023-06-22 | End: 2024-06-16

## 2023-10-31 RX ORDER — INFLIXIMAB-DYYB 100 MG/10ML
10MG/KG INJECTION, POWDER, LYOPHILIZED, FOR SOLUTION INTRAVENOUS
Qty: 0.8 GRAM | Refills: 11 | Status: ACTIVE | COMMUNITY
Start: 2022-11-21 | End: 2024-11-10

## 2023-10-31 RX ORDER — SACCHAROMYCES BOULARDII 250 MG
CAPSULE ORAL
Qty: 0 | Refills: 0 | Status: ACTIVE | COMMUNITY
Start: 1900-01-01

## 2023-12-29 ENCOUNTER — OFFICE VISIT (OUTPATIENT)
Dept: URBAN - METROPOLITAN AREA CLINIC 97 | Facility: CLINIC | Age: 48
End: 2023-12-29
Payer: COMMERCIAL

## 2023-12-29 VITALS
TEMPERATURE: 98.2 F | SYSTOLIC BLOOD PRESSURE: 118 MMHG | HEIGHT: 69 IN | WEIGHT: 163 LBS | DIASTOLIC BLOOD PRESSURE: 82 MMHG | BODY MASS INDEX: 24.14 KG/M2 | HEART RATE: 74 BPM | RESPIRATION RATE: 18 BRPM

## 2023-12-29 DIAGNOSIS — K51.80 OTHER ULCERATIVE COLITIS WITHOUT COMPLICATIONS: ICD-10-CM

## 2023-12-29 PROCEDURE — 96413 CHEMO IV INFUSION 1 HR: CPT | Performed by: INTERNAL MEDICINE

## 2023-12-29 RX ORDER — INFLIXIMAB-DYYB 100 MG/10ML
10MG/KG INJECTION, POWDER, LYOPHILIZED, FOR SOLUTION INTRAVENOUS
Qty: 0.8 GRAM | Refills: 11 | Status: ACTIVE | COMMUNITY
Start: 2022-11-21 | End: 2024-11-10

## 2023-12-29 RX ORDER — DIPHENHYDRAMINE HCL 2 %
1 TAB CREAM (GRAM) TOPICAL
Qty: 30 | Refills: 11 | Status: ACTIVE | COMMUNITY
Start: 2023-06-22 | End: 2024-06-16

## 2023-12-29 RX ORDER — SACCHAROMYCES BOULARDII 250 MG
CAPSULE ORAL
Qty: 0 | Refills: 0 | Status: ACTIVE | COMMUNITY
Start: 1900-01-01

## 2023-12-29 RX ORDER — INFLIXIMAB 100 MG/10ML
INJECTION, POWDER, LYOPHILIZED, FOR SOLUTION INTRAVENOUS
Qty: 0 | Refills: 0 | Status: ON HOLD | COMMUNITY
Start: 1900-01-01

## 2023-12-29 RX ORDER — METHYLPREDNISOLONE SODIUM SUCCINATE 40 MG/ML
20 MG INJECTION, POWDER, FOR SOLUTION INTRAMUSCULAR; INTRAVENOUS
Qty: 20 UNSPECIFIED | Refills: 11 | Status: ACTIVE | COMMUNITY
Start: 2023-06-22 | End: 2024-06-16

## 2023-12-29 RX ORDER — INFLIXIMAB 100 MG/10ML
10 MG PER KG INJECTION, POWDER, LYOPHILIZED, FOR SOLUTION INTRAVENOUS
Qty: 800 MILLIGRAM | Refills: 6 | Status: ON HOLD | COMMUNITY
Start: 2021-03-11 | End: 2028-02-03

## 2024-02-23 ENCOUNTER — INF (OUTPATIENT)
Dept: URBAN - METROPOLITAN AREA CLINIC 97 | Facility: CLINIC | Age: 49
End: 2024-02-23
Payer: COMMERCIAL

## 2024-02-23 DIAGNOSIS — K51.80 OTHER ULCERATIVE COLITIS WITHOUT COMPLICATIONS: ICD-10-CM

## 2024-02-23 PROCEDURE — 96413 CHEMO IV INFUSION 1 HR: CPT | Performed by: INTERNAL MEDICINE

## 2024-02-23 RX ORDER — METHYLPREDNISOLONE SODIUM SUCCINATE 40 MG/ML
20 MG INJECTION, POWDER, FOR SOLUTION INTRAMUSCULAR; INTRAVENOUS
Qty: 20 UNSPECIFIED | Refills: 11 | Status: ACTIVE | COMMUNITY
Start: 2023-06-22 | End: 2024-06-16

## 2024-02-23 RX ORDER — INFLIXIMAB 100 MG/10ML
INJECTION, POWDER, LYOPHILIZED, FOR SOLUTION INTRAVENOUS
Qty: 0 | Refills: 0 | Status: ON HOLD | COMMUNITY
Start: 1900-01-01

## 2024-02-23 RX ORDER — INFLIXIMAB 100 MG/10ML
10 MG PER KG INJECTION, POWDER, LYOPHILIZED, FOR SOLUTION INTRAVENOUS
Qty: 800 MILLIGRAM | Refills: 6 | Status: ON HOLD | COMMUNITY
Start: 2021-03-11 | End: 2028-02-03

## 2024-02-23 RX ORDER — SACCHAROMYCES BOULARDII 250 MG
CAPSULE ORAL
Qty: 0 | Refills: 0 | Status: ACTIVE | COMMUNITY
Start: 1900-01-01

## 2024-02-23 RX ORDER — DIPHENHYDRAMINE HCL 2 %
1 TAB CREAM (GRAM) TOPICAL
Qty: 30 | Refills: 11 | Status: ACTIVE | COMMUNITY
Start: 2023-06-22 | End: 2024-06-16

## 2024-02-23 RX ORDER — INFLIXIMAB-DYYB 100 MG/10ML
10MG/KG INJECTION, POWDER, LYOPHILIZED, FOR SOLUTION INTRAVENOUS
Qty: 0.8 GRAM | Refills: 11 | Status: ACTIVE | COMMUNITY
Start: 2022-11-21 | End: 2024-11-10

## 2024-04-18 ENCOUNTER — INF (OUTPATIENT)
Dept: URBAN - METROPOLITAN AREA CLINIC 97 | Facility: CLINIC | Age: 49
End: 2024-04-18
Payer: COMMERCIAL

## 2024-04-18 VITALS
WEIGHT: 162.8 LBS | DIASTOLIC BLOOD PRESSURE: 86 MMHG | HEART RATE: 89 BPM | HEIGHT: 69 IN | RESPIRATION RATE: 18 BRPM | SYSTOLIC BLOOD PRESSURE: 124 MMHG | BODY MASS INDEX: 24.11 KG/M2 | TEMPERATURE: 98.1 F

## 2024-04-18 DIAGNOSIS — K51.80 OTHER ULCERATIVE COLITIS WITHOUT COMPLICATIONS: ICD-10-CM

## 2024-04-18 PROCEDURE — 96413 CHEMO IV INFUSION 1 HR: CPT | Performed by: INTERNAL MEDICINE

## 2024-04-18 RX ORDER — INFLIXIMAB 100 MG/10ML
INJECTION, POWDER, LYOPHILIZED, FOR SOLUTION INTRAVENOUS
Qty: 0 | Refills: 0 | Status: ON HOLD | COMMUNITY
Start: 1900-01-01

## 2024-04-18 RX ORDER — SACCHAROMYCES BOULARDII 250 MG
CAPSULE ORAL
Qty: 0 | Refills: 0 | Status: ACTIVE | COMMUNITY
Start: 1900-01-01

## 2024-04-18 RX ORDER — METHYLPREDNISOLONE SODIUM SUCCINATE 40 MG/ML
20 MG INJECTION, POWDER, FOR SOLUTION INTRAMUSCULAR; INTRAVENOUS
Qty: 20 UNSPECIFIED | Refills: 11 | Status: ACTIVE | COMMUNITY
Start: 2023-06-22 | End: 2024-06-16

## 2024-04-18 RX ORDER — INFLIXIMAB-DYYB 100 MG/10ML
10MG/KG INJECTION, POWDER, LYOPHILIZED, FOR SOLUTION INTRAVENOUS
Qty: 0.8 GRAM | Refills: 11 | Status: ACTIVE | COMMUNITY
Start: 2022-11-21 | End: 2024-11-10

## 2024-04-18 RX ORDER — INFLIXIMAB 100 MG/10ML
10 MG PER KG INJECTION, POWDER, LYOPHILIZED, FOR SOLUTION INTRAVENOUS
Qty: 800 MILLIGRAM | Refills: 6 | Status: ON HOLD | COMMUNITY
Start: 2021-03-11 | End: 2028-02-03

## 2024-04-18 RX ORDER — DIPHENHYDRAMINE HCL 2 %
1 TAB CREAM (GRAM) TOPICAL
Qty: 30 | Refills: 11 | Status: ACTIVE | COMMUNITY
Start: 2023-06-22 | End: 2024-06-16

## 2024-05-02 ENCOUNTER — DASHBOARD ENCOUNTERS (OUTPATIENT)
Age: 49
End: 2024-05-02

## 2024-05-02 ENCOUNTER — OFFICE VISIT (OUTPATIENT)
Dept: URBAN - METROPOLITAN AREA TELEHEALTH 2 | Facility: TELEHEALTH | Age: 49
End: 2024-05-02
Payer: COMMERCIAL

## 2024-05-02 DIAGNOSIS — K51.00 ULCERATIVE PANCOLITIS WITHOUT COMPLICATION: ICD-10-CM

## 2024-05-02 PROCEDURE — 99442 PHONE E/M BY PHYS 11-20 MIN: CPT | Performed by: INTERNAL MEDICINE

## 2024-05-02 RX ORDER — INFLIXIMAB 100 MG/10ML
INJECTION, POWDER, LYOPHILIZED, FOR SOLUTION INTRAVENOUS
Qty: 0 | Refills: 0 | Status: ON HOLD | COMMUNITY
Start: 1900-01-01

## 2024-05-02 RX ORDER — DIPHENHYDRAMINE HCL 2 %
1 TAB CREAM (GRAM) TOPICAL
Qty: 30 | Refills: 11 | Status: ACTIVE | COMMUNITY
Start: 2023-06-22 | End: 2024-06-16

## 2024-05-02 RX ORDER — METHYLPREDNISOLONE SODIUM SUCCINATE 40 MG/ML
20 MG INJECTION, POWDER, FOR SOLUTION INTRAMUSCULAR; INTRAVENOUS
Qty: 20 UNSPECIFIED | Refills: 11 | Status: ACTIVE | COMMUNITY
Start: 2023-06-22 | End: 2024-06-16

## 2024-05-02 RX ORDER — INFLIXIMAB 100 MG/10ML
10 MG PER KG INJECTION, POWDER, LYOPHILIZED, FOR SOLUTION INTRAVENOUS
Qty: 800 MILLIGRAM | Refills: 6 | Status: ON HOLD | COMMUNITY
Start: 2021-03-11 | End: 2028-02-03

## 2024-05-02 RX ORDER — INFLIXIMAB-DYYB 100 MG/10ML
10MG/KG INJECTION, POWDER, LYOPHILIZED, FOR SOLUTION INTRAVENOUS
Qty: 0.8 GRAM | Refills: 11 | Status: ACTIVE | COMMUNITY
Start: 2022-11-21 | End: 2024-11-10

## 2024-05-02 RX ORDER — SACCHAROMYCES BOULARDII 250 MG
CAPSULE ORAL
Qty: 0 | Refills: 0 | Status: ACTIVE | COMMUNITY
Start: 1900-01-01

## 2024-05-12 LAB
A/G RATIO: 1.2
ALBUMIN: 3.9
ALKALINE PHOSPHATASE: 49
ALT (SGPT): 12
AST (SGOT): 20
BILIRUBIN, TOTAL: 0.6
BUN/CREATININE RATIO: (no result)
BUN: 14
CALCIUM: 9.1
CARBON DIOXIDE, TOTAL: 27
CHLORIDE: 103
CHOL/HDLC RATIO: 2.7
CHOLESTEROL, TOTAL: 151
CREATININE: 0.82
EGFR: 89
GLOBULIN, TOTAL: 3.3
GLUCOSE: 62
HDL CHOLESTEROL: 56
HEMATOCRIT: 37
HEMOGLOBIN: 12.3
LDL CHOLESTEROL CALC: 76
MCH: 28.4
MCHC: 33.2
MCV: 85.5
MITOGEN-NIL: >10
MPV: 10.9
NON HDL CHOLESTEROL: 95
PLATELET COUNT: 278
POTASSIUM: 4
PROTEIN, TOTAL: 7.2
QUANTIFERON NIL VALUE: 0.04
QUANTIFERON TB1 AG VALUE: <0
QUANTIFERON TB2 AG VALUE: 0
QUANTIFERON-TB GOLD PLUS: NEGATIVE
RDW: 12.5
RED BLOOD CELL COUNT: 4.33
SODIUM: 138
TRIGLYCERIDES: 106
TSH: 1.89
VITAMIN D,25-OH,TOTAL,IA: 36
WHITE BLOOD CELL COUNT: 5.9

## 2024-05-30 ENCOUNTER — TELEPHONE ENCOUNTER (OUTPATIENT)
Dept: URBAN - METROPOLITAN AREA CLINIC 6 | Facility: CLINIC | Age: 49
End: 2024-05-30

## 2024-06-12 ENCOUNTER — OFFICE VISIT (OUTPATIENT)
Dept: URBAN - METROPOLITAN AREA CLINIC 97 | Facility: CLINIC | Age: 49
End: 2024-06-12
Payer: COMMERCIAL

## 2024-06-12 VITALS
WEIGHT: 162 LBS | SYSTOLIC BLOOD PRESSURE: 138 MMHG | RESPIRATION RATE: 18 BRPM | HEART RATE: 78 BPM | TEMPERATURE: 98.2 F | BODY MASS INDEX: 23.99 KG/M2 | HEIGHT: 69 IN | DIASTOLIC BLOOD PRESSURE: 83 MMHG

## 2024-06-12 DIAGNOSIS — K51.80 CHRONIC PANCOLONIC ULCERATIVE COLITIS: ICD-10-CM

## 2024-06-12 PROCEDURE — 96413 CHEMO IV INFUSION 1 HR: CPT | Performed by: INTERNAL MEDICINE

## 2024-06-12 RX ORDER — METHYLPREDNISOLONE SODIUM SUCCINATE 40 MG/ML
20 MG INJECTION, POWDER, FOR SOLUTION INTRAMUSCULAR; INTRAVENOUS
Qty: 20 UNSPECIFIED | Refills: 11 | Status: ACTIVE | COMMUNITY
Start: 2023-06-22 | End: 2024-06-16

## 2024-06-12 RX ORDER — SACCHAROMYCES BOULARDII 250 MG
CAPSULE ORAL
Qty: 0 | Refills: 0 | Status: ACTIVE | COMMUNITY
Start: 1900-01-01

## 2024-06-12 RX ORDER — INFLIXIMAB-DYYB 100 MG/10ML
10MG/KG INJECTION, POWDER, LYOPHILIZED, FOR SOLUTION INTRAVENOUS
Qty: 0.8 GRAM | Refills: 11 | Status: ACTIVE | COMMUNITY
Start: 2022-11-21 | End: 2026-05-25

## 2024-06-12 RX ORDER — INFLIXIMAB 100 MG/10ML
10 MG PER KG INJECTION, POWDER, LYOPHILIZED, FOR SOLUTION INTRAVENOUS
Qty: 800 MILLIGRAM | Refills: 6 | Status: ON HOLD | COMMUNITY
Start: 2021-03-11 | End: 2028-02-03

## 2024-06-12 RX ORDER — DIPHENHYDRAMINE HCL 2 %
1 TAB CREAM (GRAM) TOPICAL
Qty: 30 | Refills: 11 | Status: ACTIVE | COMMUNITY
Start: 2023-06-22 | End: 2024-06-16

## 2024-06-12 RX ORDER — INFLIXIMAB 100 MG/10ML
INJECTION, POWDER, LYOPHILIZED, FOR SOLUTION INTRAVENOUS
Qty: 0 | Refills: 0 | Status: ON HOLD | COMMUNITY
Start: 1900-01-01

## 2024-06-14 ENCOUNTER — OFFICE VISIT (OUTPATIENT)
Dept: URBAN - METROPOLITAN AREA CLINIC 97 | Facility: CLINIC | Age: 49
End: 2024-06-14

## 2024-08-07 ENCOUNTER — OFFICE VISIT (OUTPATIENT)
Dept: URBAN - METROPOLITAN AREA CLINIC 97 | Facility: CLINIC | Age: 49
End: 2024-08-07
Payer: COMMERCIAL

## 2024-08-07 VITALS
BODY MASS INDEX: 24.08 KG/M2 | HEART RATE: 76 BPM | DIASTOLIC BLOOD PRESSURE: 80 MMHG | HEIGHT: 69 IN | TEMPERATURE: 98.3 F | SYSTOLIC BLOOD PRESSURE: 128 MMHG | RESPIRATION RATE: 17 BRPM | WEIGHT: 162.6 LBS

## 2024-08-07 DIAGNOSIS — K51.80 OTHER ULCERATIVE COLITIS WITHOUT COMPLICATIONS: ICD-10-CM

## 2024-08-07 PROCEDURE — 96413 CHEMO IV INFUSION 1 HR: CPT | Performed by: INTERNAL MEDICINE

## 2024-08-07 RX ORDER — INFLIXIMAB 100 MG/10ML
INJECTION, POWDER, LYOPHILIZED, FOR SOLUTION INTRAVENOUS
Qty: 0 | Refills: 0 | Status: ON HOLD | COMMUNITY
Start: 1900-01-01

## 2024-08-07 RX ORDER — INFLIXIMAB-DYYB 100 MG/10ML
10MG/KG INJECTION, POWDER, LYOPHILIZED, FOR SOLUTION INTRAVENOUS
Qty: 0.8 GRAM | Refills: 11 | Status: ACTIVE | COMMUNITY
Start: 2022-11-21 | End: 2026-05-25

## 2024-08-07 RX ORDER — SACCHAROMYCES BOULARDII 250 MG
CAPSULE ORAL
Qty: 0 | Refills: 0 | Status: ACTIVE | COMMUNITY
Start: 1900-01-01

## 2024-08-07 RX ORDER — INFLIXIMAB 100 MG/10ML
10 MG PER KG INJECTION, POWDER, LYOPHILIZED, FOR SOLUTION INTRAVENOUS
Qty: 800 MILLIGRAM | Refills: 6 | Status: ON HOLD | COMMUNITY
Start: 2021-03-11 | End: 2028-02-03

## 2024-10-10 ENCOUNTER — OFFICE VISIT (OUTPATIENT)
Dept: URBAN - METROPOLITAN AREA CLINIC 97 | Facility: CLINIC | Age: 49
End: 2024-10-10
Payer: COMMERCIAL

## 2024-10-10 VITALS
HEIGHT: 69 IN | SYSTOLIC BLOOD PRESSURE: 115 MMHG | RESPIRATION RATE: 18 BRPM | HEART RATE: 79 BPM | DIASTOLIC BLOOD PRESSURE: 72 MMHG | TEMPERATURE: 98.4 F | BODY MASS INDEX: 24.29 KG/M2 | WEIGHT: 164 LBS

## 2024-10-10 DIAGNOSIS — K51.90 CHRONIC ULCERATIVE COLITIS: ICD-10-CM

## 2024-10-10 PROCEDURE — 96413 CHEMO IV INFUSION 1 HR: CPT | Performed by: INTERNAL MEDICINE

## 2024-10-10 RX ORDER — INFLIXIMAB-DYYB 100 MG/10ML
10MG/KG INJECTION, POWDER, LYOPHILIZED, FOR SOLUTION INTRAVENOUS
Qty: 0.8 GRAM | Refills: 11 | Status: ACTIVE | COMMUNITY
Start: 2022-11-21 | End: 2026-05-25

## 2024-10-10 RX ORDER — INFLIXIMAB 100 MG/10ML
10 MG PER KG INJECTION, POWDER, LYOPHILIZED, FOR SOLUTION INTRAVENOUS
Qty: 800 MILLIGRAM | Refills: 6 | Status: ON HOLD | COMMUNITY
Start: 2021-03-11 | End: 2028-02-03

## 2024-10-10 RX ORDER — INFLIXIMAB 100 MG/10ML
INJECTION, POWDER, LYOPHILIZED, FOR SOLUTION INTRAVENOUS
Qty: 0 | Refills: 0 | Status: ON HOLD | COMMUNITY
Start: 1900-01-01

## 2024-10-10 RX ORDER — SACCHAROMYCES BOULARDII 250 MG
CAPSULE ORAL
Qty: 0 | Refills: 0 | Status: ACTIVE | COMMUNITY
Start: 1900-01-01

## 2024-12-05 ENCOUNTER — OFFICE VISIT (OUTPATIENT)
Dept: URBAN - METROPOLITAN AREA CLINIC 97 | Facility: CLINIC | Age: 49
End: 2024-12-05
Payer: COMMERCIAL

## 2024-12-05 VITALS
DIASTOLIC BLOOD PRESSURE: 60 MMHG | BODY MASS INDEX: 24.44 KG/M2 | SYSTOLIC BLOOD PRESSURE: 148 MMHG | HEART RATE: 80 BPM | RESPIRATION RATE: 16 BRPM | HEIGHT: 69 IN | WEIGHT: 165 LBS | TEMPERATURE: 98.5 F

## 2024-12-05 DIAGNOSIS — K51.00 ULCERATIVE PANCOLITIS WITHOUT COMPLICATION: ICD-10-CM

## 2024-12-05 PROCEDURE — 96413 CHEMO IV INFUSION 1 HR: CPT | Performed by: INTERNAL MEDICINE

## 2024-12-05 RX ORDER — INFLIXIMAB 100 MG/10ML
10 MG PER KG INJECTION, POWDER, LYOPHILIZED, FOR SOLUTION INTRAVENOUS
Qty: 800 MILLIGRAM | Refills: 6 | Status: ON HOLD | COMMUNITY
Start: 2021-03-11 | End: 2028-02-03

## 2024-12-05 RX ORDER — SACCHAROMYCES BOULARDII 250 MG
CAPSULE ORAL
Qty: 0 | Refills: 0 | Status: ACTIVE | COMMUNITY
Start: 1900-01-01

## 2024-12-05 RX ORDER — INFLIXIMAB 100 MG/10ML
INJECTION, POWDER, LYOPHILIZED, FOR SOLUTION INTRAVENOUS
Qty: 0 | Refills: 0 | Status: ON HOLD | COMMUNITY
Start: 1900-01-01

## 2024-12-05 RX ORDER — INFLIXIMAB-DYYB 100 MG/10ML
10MG/KG INJECTION, POWDER, LYOPHILIZED, FOR SOLUTION INTRAVENOUS
Qty: 0.8 GRAM | Refills: 11 | Status: ACTIVE | COMMUNITY
Start: 2022-11-21 | End: 2026-05-25

## 2025-01-30 ENCOUNTER — OFFICE VISIT (OUTPATIENT)
Dept: URBAN - METROPOLITAN AREA CLINIC 97 | Facility: CLINIC | Age: 50
End: 2025-01-30
Payer: COMMERCIAL

## 2025-01-30 VITALS
RESPIRATION RATE: 18 BRPM | HEART RATE: 85 BPM | HEIGHT: 69 IN | SYSTOLIC BLOOD PRESSURE: 128 MMHG | TEMPERATURE: 98.6 F | BODY MASS INDEX: 24.59 KG/M2 | WEIGHT: 166 LBS | DIASTOLIC BLOOD PRESSURE: 77 MMHG

## 2025-01-30 DIAGNOSIS — K51.00 ULCERATIVE PANCOLITIS WITHOUT COMPLICATION: ICD-10-CM

## 2025-01-30 PROCEDURE — 96413 CHEMO IV INFUSION 1 HR: CPT | Performed by: INTERNAL MEDICINE

## 2025-01-30 RX ORDER — INFLIXIMAB-DYYB 100 MG/10ML
10MG/KG INJECTION, POWDER, LYOPHILIZED, FOR SOLUTION INTRAVENOUS
Qty: 0.8 GRAM | Refills: 11 | Status: ACTIVE | COMMUNITY
Start: 2022-11-21 | End: 2026-05-25

## 2025-01-30 RX ORDER — SACCHAROMYCES BOULARDII 250 MG
CAPSULE ORAL
Qty: 0 | Refills: 0 | Status: ACTIVE | COMMUNITY
Start: 1900-01-01

## 2025-01-30 RX ORDER — INFLIXIMAB 100 MG/10ML
INJECTION, POWDER, LYOPHILIZED, FOR SOLUTION INTRAVENOUS
Qty: 0 | Refills: 0 | Status: ON HOLD | COMMUNITY
Start: 1900-01-01

## 2025-01-30 RX ORDER — INFLIXIMAB 100 MG/10ML
10 MG PER KG INJECTION, POWDER, LYOPHILIZED, FOR SOLUTION INTRAVENOUS
Qty: 800 MILLIGRAM | Refills: 6 | Status: ON HOLD | COMMUNITY
Start: 2021-03-11 | End: 2028-02-03

## 2025-03-27 ENCOUNTER — OFFICE VISIT (OUTPATIENT)
Dept: URBAN - METROPOLITAN AREA CLINIC 97 | Facility: CLINIC | Age: 50
End: 2025-03-27
Payer: COMMERCIAL

## 2025-03-27 DIAGNOSIS — K51.00 ULCERATIVE (CHRONIC) PANCOLITIS WITHOUT COMPLICATIONS: ICD-10-CM

## 2025-03-27 PROCEDURE — 96413 CHEMO IV INFUSION 1 HR: CPT | Performed by: INTERNAL MEDICINE

## 2025-03-27 RX ORDER — SACCHAROMYCES BOULARDII 250 MG
CAPSULE ORAL
Qty: 0 | Refills: 0 | Status: ACTIVE | COMMUNITY
Start: 1900-01-01

## 2025-03-27 RX ORDER — INFLIXIMAB 100 MG/10ML
10 MG PER KG INJECTION, POWDER, LYOPHILIZED, FOR SOLUTION INTRAVENOUS
Qty: 800 MILLIGRAM | Refills: 6 | Status: ON HOLD | COMMUNITY
Start: 2021-03-11 | End: 2028-02-03

## 2025-03-27 RX ORDER — INFLIXIMAB 100 MG/10ML
INJECTION, POWDER, LYOPHILIZED, FOR SOLUTION INTRAVENOUS
Qty: 0 | Refills: 0 | Status: ON HOLD | COMMUNITY
Start: 1900-01-01

## 2025-03-27 RX ORDER — INFLIXIMAB-DYYB 100 MG/10ML
10MG/KG INJECTION, POWDER, LYOPHILIZED, FOR SOLUTION INTRAVENOUS
Qty: 0.8 GRAM | Refills: 11 | Status: ACTIVE | COMMUNITY
Start: 2022-11-21 | End: 2026-05-25

## 2025-05-13 ENCOUNTER — TELEPHONE ENCOUNTER (OUTPATIENT)
Dept: URBAN - METROPOLITAN AREA CLINIC 97 | Facility: CLINIC | Age: 50
End: 2025-05-13

## 2025-05-22 ENCOUNTER — OFFICE VISIT (OUTPATIENT)
Dept: URBAN - METROPOLITAN AREA CLINIC 97 | Facility: CLINIC | Age: 50
End: 2025-05-22
Payer: COMMERCIAL

## 2025-05-22 DIAGNOSIS — K51.00 ULCERATIVE PANCOLITIS WITHOUT COMPLICATION: ICD-10-CM

## 2025-05-22 PROCEDURE — 96413 CHEMO IV INFUSION 1 HR: CPT | Performed by: INTERNAL MEDICINE

## 2025-05-22 RX ORDER — INFLIXIMAB 100 MG/10ML
10 MG PER KG INJECTION, POWDER, LYOPHILIZED, FOR SOLUTION INTRAVENOUS
Qty: 800 MILLIGRAM | Refills: 6 | Status: ON HOLD | COMMUNITY
Start: 2021-03-11 | End: 2028-02-03

## 2025-05-22 RX ORDER — SACCHAROMYCES BOULARDII 50 MG
CAPSULE ORAL
Qty: 0 | Refills: 0 | Status: ACTIVE | COMMUNITY
Start: 1900-01-01

## 2025-05-22 RX ORDER — INFLIXIMAB-DYYB 100 MG/10ML
10MG/KG INJECTION, POWDER, LYOPHILIZED, FOR SOLUTION INTRAVENOUS
Qty: 0.8 GRAM | Refills: 11 | Status: ACTIVE | COMMUNITY
Start: 2022-11-21 | End: 2026-05-25

## 2025-05-22 RX ORDER — INFLIXIMAB 100 MG/10ML
INJECTION, POWDER, LYOPHILIZED, FOR SOLUTION INTRAVENOUS
Qty: 0 | Refills: 0 | Status: ON HOLD | COMMUNITY
Start: 1900-01-01

## 2025-06-06 ENCOUNTER — TELEPHONE ENCOUNTER (OUTPATIENT)
Dept: URBAN - METROPOLITAN AREA CLINIC 6 | Facility: CLINIC | Age: 50
End: 2025-06-06

## 2025-06-10 ENCOUNTER — WEB ENCOUNTER (OUTPATIENT)
Dept: URBAN - METROPOLITAN AREA CLINIC 92 | Facility: CLINIC | Age: 50
End: 2025-06-10

## 2025-06-18 ENCOUNTER — TELEPHONE ENCOUNTER (OUTPATIENT)
Dept: URBAN - METROPOLITAN AREA CLINIC 92 | Facility: CLINIC | Age: 50
End: 2025-06-18

## 2025-06-19 ENCOUNTER — OFFICE VISIT (OUTPATIENT)
Dept: URBAN - METROPOLITAN AREA TELEHEALTH 2 | Facility: TELEHEALTH | Age: 50
End: 2025-06-19
Payer: COMMERCIAL

## 2025-06-19 DIAGNOSIS — R10.9 ABDOMINAL CRAMPS: ICD-10-CM

## 2025-06-19 DIAGNOSIS — K51.00 ULCERATIVE PANCOLITIS WITHOUT COMPLICATION: ICD-10-CM

## 2025-06-19 PROCEDURE — 99214 OFFICE O/P EST MOD 30 MIN: CPT | Performed by: INTERNAL MEDICINE

## 2025-06-19 RX ORDER — INFLIXIMAB 100 MG/10ML
10 MG PER KG INJECTION, POWDER, LYOPHILIZED, FOR SOLUTION INTRAVENOUS
Qty: 800 MILLIGRAM | Refills: 6 | Status: ON HOLD | COMMUNITY
Start: 2021-03-11 | End: 2028-02-03

## 2025-06-19 RX ORDER — INFLIXIMAB-DYYB 100 MG/10ML
10MG/KG INJECTION, POWDER, LYOPHILIZED, FOR SOLUTION INTRAVENOUS
Qty: 0.8 GRAM | Refills: 11 | Status: ACTIVE | COMMUNITY
Start: 2022-11-21 | End: 2026-05-25

## 2025-06-19 RX ORDER — HYOSCYAMINE SULFATE 0.12 MG/1
1 TABLET ON THE TONGUE AND ALLOW TO DISSOLVE TABLET, ORALLY DISINTEGRATING ORAL
Qty: 90 TABLET | Refills: 1 | OUTPATIENT
Start: 2025-06-19

## 2025-06-19 RX ORDER — SACCHAROMYCES BOULARDII 50 MG
CAPSULE ORAL
Qty: 0 | Refills: 0 | Status: ACTIVE | COMMUNITY
Start: 1900-01-01

## 2025-06-19 RX ORDER — INFLIXIMAB 100 MG/10ML
INJECTION, POWDER, LYOPHILIZED, FOR SOLUTION INTRAVENOUS
Qty: 0 | Refills: 0 | Status: ON HOLD | COMMUNITY
Start: 1900-01-01

## 2025-06-19 NOTE — HPI-TODAY'S VISIT:
50 yo female for follow up.  Maintained on Inflectra 5mg/kg q 8 weeks gas and bloated after lunch with varying foods BM 1-2 per day no blood

## 2025-06-24 LAB
MITOGEN-NIL: 6.59
QUANTIFERON NIL VALUE: 0.02
QUANTIFERON TB1 AG VALUE: 0
QUANTIFERON TB2 AG VALUE: 0
QUANTIFERON-TB GOLD PLUS: NEGATIVE

## 2025-07-15 ENCOUNTER — OFFICE VISIT (OUTPATIENT)
Dept: URBAN - METROPOLITAN AREA CLINIC 97 | Facility: CLINIC | Age: 50
End: 2025-07-15
Payer: COMMERCIAL

## 2025-07-15 DIAGNOSIS — K51.00 ULCERATIVE PANCOLITIS WITHOUT COMPLICATION: ICD-10-CM

## 2025-07-15 PROCEDURE — 96413 CHEMO IV INFUSION 1 HR: CPT | Performed by: INTERNAL MEDICINE

## 2025-07-15 RX ORDER — INFLIXIMAB-DYYB 100 MG/10ML
10MG/KG INJECTION, POWDER, LYOPHILIZED, FOR SOLUTION INTRAVENOUS
Qty: 0.8 GRAM | Refills: 11 | Status: ACTIVE | COMMUNITY
Start: 2022-11-21 | End: 2026-05-25

## 2025-07-15 RX ORDER — INFLIXIMAB 100 MG/10ML
INJECTION, POWDER, LYOPHILIZED, FOR SOLUTION INTRAVENOUS
Qty: 0 | Refills: 0 | Status: ON HOLD | COMMUNITY
Start: 1900-01-01

## 2025-07-15 RX ORDER — HYOSCYAMINE SULFATE 0.12 MG/1
1 TABLET ON THE TONGUE AND ALLOW TO DISSOLVE TABLET, ORALLY DISINTEGRATING ORAL
Qty: 90 TABLET | Refills: 1 | Status: ACTIVE | COMMUNITY
Start: 2025-06-19

## 2025-07-15 RX ORDER — SACCHAROMYCES BOULARDII 50 MG
CAPSULE ORAL
Qty: 0 | Refills: 0 | Status: ACTIVE | COMMUNITY
Start: 1900-01-01

## 2025-07-15 RX ORDER — INFLIXIMAB 100 MG/10ML
10 MG PER KG INJECTION, POWDER, LYOPHILIZED, FOR SOLUTION INTRAVENOUS
Qty: 800 MILLIGRAM | Refills: 6 | Status: ON HOLD | COMMUNITY
Start: 2021-03-11 | End: 2028-02-03